# Patient Record
Sex: FEMALE | Race: WHITE | Employment: OTHER | URBAN - METROPOLITAN AREA
[De-identification: names, ages, dates, MRNs, and addresses within clinical notes are randomized per-mention and may not be internally consistent; named-entity substitution may affect disease eponyms.]

---

## 2019-10-29 ENCOUNTER — APPOINTMENT (OUTPATIENT)
Dept: MRI IMAGING | Facility: CLINIC | Age: 59
End: 2019-10-29
Attending: EMERGENCY MEDICINE
Payer: COMMERCIAL

## 2019-10-29 ENCOUNTER — APPOINTMENT (OUTPATIENT)
Dept: CT IMAGING | Facility: CLINIC | Age: 59
End: 2019-10-29
Attending: EMERGENCY MEDICINE
Payer: COMMERCIAL

## 2019-10-29 ENCOUNTER — HOSPITAL ENCOUNTER (EMERGENCY)
Facility: CLINIC | Age: 59
Discharge: HOME | End: 2019-10-29
Attending: EMERGENCY MEDICINE | Admitting: EMERGENCY MEDICINE
Payer: COMMERCIAL

## 2019-10-29 VITALS
TEMPERATURE: 97.6 F | WEIGHT: 141 LBS | HEART RATE: 69 BPM | DIASTOLIC BLOOD PRESSURE: 85 MMHG | HEIGHT: 65 IN | BODY MASS INDEX: 23.49 KG/M2 | OXYGEN SATURATION: 97 % | RESPIRATION RATE: 20 BRPM | SYSTOLIC BLOOD PRESSURE: 134 MMHG

## 2019-10-29 DIAGNOSIS — G51.0 BELL'S PALSY: ICD-10-CM

## 2019-10-29 LAB
ALBUMIN UR-MCNC: NEGATIVE MG/DL
ANION GAP SERPL CALCULATED.3IONS-SCNC: 5 MMOL/L (ref 3–14)
APPEARANCE UR: CLEAR
APTT PPP: 28 SEC (ref 22–37)
BASOPHILS # BLD AUTO: 0.1 10E9/L (ref 0–0.2)
BASOPHILS NFR BLD AUTO: 0.9 %
BILIRUB UR QL STRIP: NEGATIVE
BUN SERPL-MCNC: 16 MG/DL (ref 7–30)
CALCIUM SERPL-MCNC: 9 MG/DL (ref 8.5–10.1)
CHLORIDE SERPL-SCNC: 108 MMOL/L (ref 94–109)
CO2 SERPL-SCNC: 27 MMOL/L (ref 20–32)
COLOR UR AUTO: ABNORMAL
CREAT BLD-MCNC: 0.8 MG/DL (ref 0.52–1.04)
CREAT SERPL-MCNC: 0.82 MG/DL (ref 0.52–1.04)
DIFFERENTIAL METHOD BLD: NORMAL
EOSINOPHIL # BLD AUTO: 0.1 10E9/L (ref 0–0.7)
EOSINOPHIL NFR BLD AUTO: 0.9 %
ERYTHROCYTE [DISTWIDTH] IN BLOOD BY AUTOMATED COUNT: 12.9 % (ref 10–15)
GFR SERPL CREATININE-BSD FRML MDRD: 73 ML/MIN/{1.73_M2}
GFR SERPL CREATININE-BSD FRML MDRD: 76 ML/MIN/{1.73_M2}
GLUCOSE BLDC GLUCOMTR-MCNC: 75 MG/DL (ref 70–99)
GLUCOSE SERPL-MCNC: 87 MG/DL (ref 70–99)
GLUCOSE UR STRIP-MCNC: NEGATIVE MG/DL
HCT VFR BLD AUTO: 38.9 % (ref 35–47)
HGB BLD-MCNC: 13 G/DL (ref 11.7–15.7)
HGB UR QL STRIP: NEGATIVE
IMM GRANULOCYTES # BLD: 0 10E9/L (ref 0–0.4)
IMM GRANULOCYTES NFR BLD: 0.3 %
INR PPP: 0.98 (ref 0.86–1.14)
KETONES UR STRIP-MCNC: 10 MG/DL
LEUKOCYTE ESTERASE UR QL STRIP: NEGATIVE
LYMPHOCYTES # BLD AUTO: 2.6 10E9/L (ref 0.8–5.3)
LYMPHOCYTES NFR BLD AUTO: 34.8 %
MCH RBC QN AUTO: 31.6 PG (ref 26.5–33)
MCHC RBC AUTO-ENTMCNC: 33.4 G/DL (ref 31.5–36.5)
MCV RBC AUTO: 95 FL (ref 78–100)
MONOCYTES # BLD AUTO: 0.5 10E9/L (ref 0–1.3)
MONOCYTES NFR BLD AUTO: 6.9 %
NEUTROPHILS # BLD AUTO: 4.3 10E9/L (ref 1.6–8.3)
NEUTROPHILS NFR BLD AUTO: 56.2 %
NITRATE UR QL: NEGATIVE
NRBC # BLD AUTO: 0 10*3/UL
NRBC BLD AUTO-RTO: 0 /100
PH UR STRIP: 7 PH (ref 5–7)
PLATELET # BLD AUTO: 211 10E9/L (ref 150–450)
POTASSIUM SERPL-SCNC: 3.7 MMOL/L (ref 3.4–5.3)
RBC # BLD AUTO: 4.11 10E12/L (ref 3.8–5.2)
RBC #/AREA URNS AUTO: <1 /HPF (ref 0–2)
SODIUM SERPL-SCNC: 140 MMOL/L (ref 133–144)
SOURCE: ABNORMAL
SP GR UR STRIP: 1.01 (ref 1–1.03)
SQUAMOUS #/AREA URNS AUTO: <1 /HPF (ref 0–1)
TROPONIN I SERPL-MCNC: <0.015 UG/L (ref 0–0.04)
UROBILINOGEN UR STRIP-MCNC: NORMAL MG/DL (ref 0–2)
WBC # BLD AUTO: 7.6 10E9/L (ref 4–11)
WBC #/AREA URNS AUTO: <1 /HPF (ref 0–5)

## 2019-10-29 PROCEDURE — 70450 CT HEAD/BRAIN W/O DYE: CPT | Mod: XS

## 2019-10-29 PROCEDURE — 82565 ASSAY OF CREATININE: CPT

## 2019-10-29 PROCEDURE — A9585 GADOBUTROL INJECTION: HCPCS | Performed by: EMERGENCY MEDICINE

## 2019-10-29 PROCEDURE — 85730 THROMBOPLASTIN TIME PARTIAL: CPT | Performed by: EMERGENCY MEDICINE

## 2019-10-29 PROCEDURE — 25000128 H RX IP 250 OP 636: Performed by: EMERGENCY MEDICINE

## 2019-10-29 PROCEDURE — 99285 EMERGENCY DEPT VISIT HI MDM: CPT | Mod: 25

## 2019-10-29 PROCEDURE — 85610 PROTHROMBIN TIME: CPT | Performed by: EMERGENCY MEDICINE

## 2019-10-29 PROCEDURE — 86618 LYME DISEASE ANTIBODY: CPT | Performed by: EMERGENCY MEDICINE

## 2019-10-29 PROCEDURE — 25500064 ZZH RX 255 OP 636: Performed by: EMERGENCY MEDICINE

## 2019-10-29 PROCEDURE — 93005 ELECTROCARDIOGRAM TRACING: CPT

## 2019-10-29 PROCEDURE — 81001 URINALYSIS AUTO W/SCOPE: CPT | Performed by: EMERGENCY MEDICINE

## 2019-10-29 PROCEDURE — 70498 CT ANGIOGRAPHY NECK: CPT

## 2019-10-29 PROCEDURE — 0042T CT HEAD PERFUSION WITH CONTRAST: CPT

## 2019-10-29 PROCEDURE — 70553 MRI BRAIN STEM W/O & W/DYE: CPT

## 2019-10-29 PROCEDURE — 85025 COMPLETE CBC W/AUTO DIFF WBC: CPT | Performed by: EMERGENCY MEDICINE

## 2019-10-29 PROCEDURE — 80048 BASIC METABOLIC PNL TOTAL CA: CPT | Performed by: EMERGENCY MEDICINE

## 2019-10-29 PROCEDURE — 25000125 ZZHC RX 250: Performed by: EMERGENCY MEDICINE

## 2019-10-29 PROCEDURE — 84484 ASSAY OF TROPONIN QUANT: CPT | Performed by: EMERGENCY MEDICINE

## 2019-10-29 PROCEDURE — 00000146 ZZHCL STATISTIC GLUCOSE BY METER IP

## 2019-10-29 RX ORDER — VALACYCLOVIR HYDROCHLORIDE 1 G/1
1000 TABLET, FILM COATED ORAL 3 TIMES DAILY
Qty: 21 TABLET | Refills: 0 | Status: SHIPPED | OUTPATIENT
Start: 2019-10-29 | End: 2019-11-05

## 2019-10-29 RX ORDER — IOPAMIDOL 755 MG/ML
500 INJECTION, SOLUTION INTRAVASCULAR ONCE
Status: COMPLETED | OUTPATIENT
Start: 2019-10-29 | End: 2019-10-29

## 2019-10-29 RX ORDER — GADOBUTROL 604.72 MG/ML
7.5 INJECTION INTRAVENOUS ONCE
Status: COMPLETED | OUTPATIENT
Start: 2019-10-29 | End: 2019-10-29

## 2019-10-29 RX ORDER — PREDNISONE 20 MG/1
60 TABLET ORAL DAILY
Qty: 21 TABLET | Refills: 0 | Status: SHIPPED | OUTPATIENT
Start: 2019-10-29 | End: 2019-11-05

## 2019-10-29 RX ORDER — ERYTHROMYCIN 5 MG/G
0.5 OINTMENT OPHTHALMIC AT BEDTIME
Qty: 1 TUBE | Refills: 0 | Status: SHIPPED | OUTPATIENT
Start: 2019-10-29 | End: 2019-11-05

## 2019-10-29 RX ADMIN — SODIUM CHLORIDE 95 ML: 9 INJECTION, SOLUTION INTRAVENOUS at 17:49

## 2019-10-29 RX ADMIN — GADOBUTROL 6 ML: 604.72 INJECTION INTRAVENOUS at 19:30

## 2019-10-29 RX ADMIN — IOPAMIDOL 120 ML: 755 INJECTION, SOLUTION INTRAVENOUS at 17:49

## 2019-10-29 ASSESSMENT — ENCOUNTER SYMPTOMS
HEADACHES: 1
SPEECH DIFFICULTY: 0
WEAKNESS: 1
NUMBNESS: 0
ABDOMINAL PAIN: 0
VOMITING: 0
SORE THROAT: 1
DIZZINESS: 1
NAUSEA: 0
DIARRHEA: 0

## 2019-10-29 ASSESSMENT — MIFFLIN-ST. JEOR: SCORE: 1205.45

## 2019-10-29 NOTE — ED TRIAGE NOTES
Pt. Arrived w/ complaints of dizziness, headache, and inability to keep water in her mouth. Pt. Mentions her smile looks different to her as well and reports R sided eye and facial weakness. A&O x 4.

## 2019-10-29 NOTE — ED PROVIDER NOTES
"  History     Chief Complaint:  Dizziness, headache    HPI   Radha Ariza is a 59 year old female with a history of left sided Bell's Palsy in 2016, who presents with a headache for the past several days and dizziness and right sided facial weakness beginning at 9:00 AM today. Patient describes that her headache has been localized to her forehead and to the back of her neck. She also notes that five days ago she felt \"feverish\" but says this resolved after laying low and sleeping over this past weekend. She reports that while brushing her teeth this morning, she noticed difficulty keeping water in her mouth and states that throughout the day her smile has looked different to her. Patient also notes that her right eye feels a bit dry and droopy. She denies experiencing migraines, but does say that she has had some increase in headaches recently. She also reports that she has had a sore throat \"almost constantly\" but she believes this is because she is a le. Patient took Motrin today for her headache with some relief. Denies numbness, speech difficulty, confusion, difficulty walking, extremity weakness, cough, vomiting, diarrhea, abdominal pain, or chest pain.    Allergies:  NKDA     Medications:    The patient is not currently taking any prescribed medications.      Past Medical History:    Bell's Palsy    Past Surgical History:    Gyn surgery  Malignant skin lesion   Dental oral surgery   Extosis leg removed     Family History:    Cancer  HLD  Cataract  CAD  DM  Glaucoma  Retinal detachment  CVD    Social History:  Smoking status: never   Alcohol use: yes   Drug use: no  PCP: Lynda Antunez  Marital Status:        Review of Systems   HENT: Positive for sore throat (chronic).    Cardiovascular: Negative for chest pain.   Gastrointestinal: Negative for abdominal pain, diarrhea, nausea and vomiting.   Neurological: Positive for dizziness, weakness (facial ) and headaches. Negative for speech " "difficulty and numbness.   All other systems reviewed and are negative.    Physical Exam     Patient Vitals for the past 24 hrs:   BP Temp Temp src Pulse Heart Rate SpO2 Height Weight   10/29/19 2023 -- -- -- -- -- 97 % -- --   10/29/19 2016 -- -- -- -- -- 98 % -- --   10/29/19 2015 131/89 -- -- 61 -- 98 % -- --   10/29/19 2000 (!) 141/86 -- -- 65 -- 96 % -- --   10/29/19 1915 131/88 -- -- 60 -- 98 % -- --   10/29/19 1900 131/82 -- -- 62 -- 94 % -- --   10/29/19 1830 (!) 114/95 -- -- 70 -- 99 % -- --   10/29/19 1815 138/87 -- -- 70 -- 98 % -- --   10/29/19 1800 135/79 -- -- 61 -- -- -- --   10/29/19 1719 -- 97.6  F (36.4  C) Oral -- -- -- -- --   10/29/19 1715 (!) 166/104 -- -- 67 -- 100 % -- --   10/29/19 1710 (!) 170/92 -- -- 65 65 100 % 1.651 m (5' 5\") 64 kg (141 lb)        Physical Exam  VS: Reviewed per above  HENT: Mucous membranes moist, no nuchal rigidity. Uvula midline, no tonsillar hypertrophy nor asymmetry. Tolerating secretions, normal phonation. No nuchal rigidity.  EYES: sclera anicteric  CV: Rate as noted, regular rhythm.   RESP: Effort normal. Breath sounds are normal bilaterally.  GI:  no tenderness/rebound/guarding, not distended.  NEURO: GCS 15, cranial nerves II through XII are intact aside from CN7- R facial weakness (lower weakness appreciated), 5 out of 5 strength in all 4 extremities, sensation is intact light touch in all 4 extremities  MSK: No deformity of the extremities  SKIN: Warm and dry    Emergency Department Course   ECG:  ECG (17:10:32):  Rate 59 bpm. [place] interval 142. QRS duration 82. QT/QTc 418/413. P-R-T axes 76 75 58. Sinus bradycardia. Otherwise normal ECG. Agree with computer interpretation. No significant change compared to EKG dated 12/27/16.  Interpreted at 1730 by Ruben Campos MD.     Imaging:  Radiographic findings were communicated with the patient who voiced understanding of the findings.    CT Head w/o Contrast  1.  Very minimal changes of small vessel vascular " .  Examination discussed with Dr. Campos at 5:50 PM.  As read by Radiology.    CTA Head Neck with Contrast  HEAD CTA:   1.  Normal CTA [place].    NECK CTA:  1.  Normal neck CTA.  As read by Radiology.    CT Head Perfusion w Contrast  CT PERFUSION:  1.  Unsuccessful.  As read by Radiology.    MR Brain w/o & w Contrast  Very minimal changes of small vessel vascular disease.  As read by Radiology.    Laboratory:  UA: ketones 10, o/w negative     CBC: WBC 7.6, HGB 13.0,    BMP: WNL (Creatinine: 0.82)  Creatinine POCT: 0.8  Troponin 1 (1715): <0.015  PTT: 28  INR: 0.98  Glucose by meter: 75    Lyme disease Talia with reflex to WB serum: pending     Emergency Department Course:  1730: Nursing notes and vitals reviewed. The patient was signed out to me per Dr. Osorio.     Blood drawn. This was sent to the lab for further testing, results above. The patient provided a urine sample here in the emergency department. This was sent for laboratory testing, findings above.      The patient was sent for the above imaging while in the emergency department, findings above.     1752: I consulted with Dr. Mayorga, Radiology, regarding the patient's history and presentation here in the emergency department.    I performed an exam of the patient as documented above.     1820: Code stroke de-escalated.     2020: I rechecked the patient and discussed the results of her workup thus far.     Findings and plan explained to the Patient. Patient discharged home with instructions regarding supportive care, medications, and reasons to return. The importance of close follow-up was reviewed. The patient was prescribed prednisone, valtrex, erythromycin, and artificial tears.     I personally reviewed the laboratory results with the Patient and answered all related questions prior to discharge.     Impression & Plan      Medical Decision Making:  Pt presents to the ER for evaluation of R facial weakness. VS wnl. Exam with R lower  facial weakness only. Pt initially seen by Dr Osorio who spoke with stroke neuro with plans for MR brain if negative CTs. CT head/CTA head/neck negative. Labs wnl. MR Brain without acute abnormality. Plan for treatment of likely Bell's palsy with valacyclovir, prednisone, lubricating eye ointment at night and eyedrops during the day.  Recommended recheck with PCP.  Close return precautions were discussed prior to discharge.      Diagnosis:    ICD-10-CM    1. Bell's palsy G51.0        Disposition:  discharged to home    Discharge Medications:  New Prescriptions    ERYTHROMYCIN (ROMYCIN) 5 MG/GM OPHTHALMIC OINTMENT    Place 0.5 inches into the right eye At Bedtime for 7 days    HYPROMELLOSE-DEXTRAN (ARTIFICAL TEARS) 0.1-0.3 % OPHTHALMIC SOLUTION    Place 1 drop into the right eye every hour (while awake)    PREDNISONE (DELTASONE) 20 MG TABLET    Take 3 tablets (60 mg) by mouth daily for 7 days    VALACYCLOVIR (VALTREX) 1000 MG TABLET    Take 1 tablet (1,000 mg) by mouth 3 times daily for 7 days     Shannon RAMIREZ, am serving as a scribe at 5:27 PM on 10/29/2019 to document services personally performed by Ruben Campos MD based on my observations and the provider's statements to me.     Shannon Bowen  10/29/2019   [facility] EMERGENCY DEPARTMENT       Ruben Campos MD  10/29/19 2044

## 2019-10-29 NOTE — ED NOTES
5:22 PM stroke neuro called back, agrees with plan.    CT head non con, if normal then MRi     Columba Osorio MD  10/29/19 8230

## 2019-10-29 NOTE — ED AVS SNAPSHOT
[facility]  [address]  [facility] [address]  Phone:  [phone]  Fax:  [phone]                                    Radha Ariza   MRN: [id number]    Department:  [facility]   Date of Visit:  10/29/2019           After Visit Summary Signature Page    I have received my discharge instructions, and my questions have been answered. I have discussed any challenges I see with this plan with the nurse or doctor.    ..........................................................................................................................................  Patient/Patient Representative Signature      ..........................................................................................................................................  Patient Representative Print Name and Relationship to Patient    ..................................................               ................................................  Date                                   Time    ..........................................................................................................................................  Reviewed by Signature/Title    ...................................................              ..............................................  Date                                               Time          22EPIC Rev 08/18

## 2019-10-29 NOTE — ED NOTES
5:20 PM Patient briefly seen, clinically bells palsy on the right side.    Code CVA called, rest of neuro exam normal.    Patient was here in 2016 for left sided bells palsy    [employer] Stroke Scale     Score    Level of consciousness: 0    LOC questions: 0    LOC commands: 0    Best gaze: 0    Visual: 0    Facial palsy: 1    Motor arm (left): 0    Motor arm (right): 0    Motor leg (left): 0    Motor leg (right): 0    Limb ataxia: 0    Sensory: 0    Best language: 0    Dysarthria: 0    Extinction and inattention: 0        Total Score:  1     GEN: patient smiling, no distress  NEURO: GCS 15, cranial nerves intact, subtle right face (forehead and right mouth) drooping.  Tongue midline.  Motor- moves all 4 extremities with 5/5 strength,  5/5.  DF and PF 5/5.  Sensation- intact all dermatones to pinprick and light touch.  Reflexes- DTRs 2plus.  Coordination- romberg negative, normal tandem gait, no ataxia.  Overall symmetrical exam  HEME: no bruising or petechiae/contusions           Columba Osorio MD  10/29/19 1907

## 2019-10-30 ENCOUNTER — NURSE TRIAGE (OUTPATIENT)
Dept: NURSING | Facility: CLINIC | Age: 59
End: 2019-10-30

## 2019-10-30 LAB
B BURGDOR IGG+IGM SER QL: 0.06 (ref 0–0.89)
INTERPRETATION ECG - MUSE: NORMAL

## 2019-10-30 NOTE — TELEPHONE ENCOUNTER
Radha is calling to clarify that the Prednisone she received at ER is not for a taper, that it is for a set dose for 7 days.    Advised caller that chart documentation shows Prednisone 60 mg daily x 7 days in charting and how Rx is written.  No taper indicated.    Advised that she may want to speak to PCP care team to discuss and if they have concerns about this dose for 7 days they may be able to review ED course and plan of care.    Leslie Ash RN  [facility]

## 2019-10-30 NOTE — DISCHARGE INSTRUCTIONS
Return for new or worsening symptoms. Use the ointment to keep eye moist at night if there is concern that the eye is not closing fully. Likewise, apply artifical tears to R eye every hour while awake if concern that eye is not closing all the way. Take prednisone and valacyclovir as prescribed.

## 2020-02-17 ENCOUNTER — HEALTH MAINTENANCE LETTER (OUTPATIENT)
Age: 60
End: 2020-02-17

## 2020-11-29 ENCOUNTER — HEALTH MAINTENANCE LETTER (OUTPATIENT)
Age: 60
End: 2020-11-29

## 2021-04-10 ENCOUNTER — HEALTH MAINTENANCE LETTER (OUTPATIENT)
Age: 61
End: 2021-04-10

## 2021-09-25 ENCOUNTER — HEALTH MAINTENANCE LETTER (OUTPATIENT)
Age: 61
End: 2021-09-25

## 2022-03-06 ENCOUNTER — HEALTH MAINTENANCE LETTER (OUTPATIENT)
Age: 62
End: 2022-03-06

## 2022-05-01 ENCOUNTER — HEALTH MAINTENANCE LETTER (OUTPATIENT)
Age: 62
End: 2022-05-01

## 2022-12-26 ENCOUNTER — HEALTH MAINTENANCE LETTER (OUTPATIENT)
Age: 62
End: 2022-12-26

## 2023-07-09 ENCOUNTER — HEALTH MAINTENANCE LETTER (OUTPATIENT)
Age: 63
End: 2023-07-09